# Patient Record
Sex: FEMALE | Race: WHITE | Employment: STUDENT | URBAN - METROPOLITAN AREA
[De-identification: names, ages, dates, MRNs, and addresses within clinical notes are randomized per-mention and may not be internally consistent; named-entity substitution may affect disease eponyms.]

---
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
## 2025-04-02 ENCOUNTER — TELEPHONE (OUTPATIENT)
Dept: CARDIOLOGY CLINIC | Age: 20
End: 2025-04-02

## 2025-04-02 NOTE — TELEPHONE ENCOUNTER
Received referral via fax    Referral for: [facility]  Reason: Mitral valve prolapse, syncope, palpitations  Refer by: Dr. Charlette Candelario     Can schedule with Slade or Lisa?     Referral scanned to chart, attached to this encounter.